# Patient Record
Sex: FEMALE | Race: AMERICAN INDIAN OR ALASKA NATIVE
[De-identification: names, ages, dates, MRNs, and addresses within clinical notes are randomized per-mention and may not be internally consistent; named-entity substitution may affect disease eponyms.]

---

## 2020-11-18 ENCOUNTER — HOSPITAL ENCOUNTER (OUTPATIENT)
Dept: HOSPITAL 5 - TRG | Age: 25
Discharge: HOME | End: 2020-11-18
Attending: OBSTETRICS & GYNECOLOGY
Payer: MEDICAID

## 2020-11-18 VITALS — SYSTOLIC BLOOD PRESSURE: 132 MMHG | DIASTOLIC BLOOD PRESSURE: 71 MMHG

## 2020-11-18 DIAGNOSIS — Z3A.36: ICD-10-CM

## 2020-11-18 DIAGNOSIS — O47.03: Primary | ICD-10-CM

## 2020-11-18 LAB
BASOPHILS # (AUTO): 0.1 K/MM3 (ref 0–0.1)
BASOPHILS NFR BLD AUTO: 0.6 % (ref 0–1.8)
EOSINOPHIL # BLD AUTO: 0.2 K/MM3 (ref 0–0.4)
EOSINOPHIL NFR BLD AUTO: 2.1 % (ref 0–4.3)
HCT VFR BLD CALC: 30.8 % (ref 30.3–42.9)
HGB BLD-MCNC: 10.1 GM/DL (ref 10.1–14.3)
LYMPHOCYTES # BLD AUTO: 1.4 K/MM3 (ref 1.2–5.4)
LYMPHOCYTES NFR BLD AUTO: 15.7 % (ref 13.4–35)
MCHC RBC AUTO-ENTMCNC: 33 % (ref 30–34)
MCV RBC AUTO: 92 FL (ref 79–97)
MONOCYTES # (AUTO): 0.9 K/MM3 (ref 0–0.8)
MONOCYTES % (AUTO): 10.4 % (ref 0–7.3)
PLATELET # BLD: 353 K/MM3 (ref 140–440)
RBC # BLD AUTO: 3.35 M/MM3 (ref 3.65–5.03)

## 2020-11-18 PROCEDURE — 36415 COLL VENOUS BLD VENIPUNCTURE: CPT

## 2020-11-18 PROCEDURE — 76805 OB US >/= 14 WKS SNGL FETUS: CPT

## 2020-11-18 PROCEDURE — 85025 COMPLETE CBC W/AUTO DIFF WBC: CPT

## 2020-11-18 PROCEDURE — 87806 HIV AG W/HIV1&2 ANTB W/OPTIC: CPT

## 2020-11-18 PROCEDURE — 86762 RUBELLA ANTIBODY: CPT

## 2020-11-18 PROCEDURE — 86706 HEP B SURFACE ANTIBODY: CPT

## 2020-11-18 PROCEDURE — 86803 HEPATITIS C AB TEST: CPT

## 2020-11-18 PROCEDURE — 86592 SYPHILIS TEST NON-TREP QUAL: CPT

## 2020-11-18 PROCEDURE — 76819 FETAL BIOPHYS PROFIL W/O NST: CPT

## 2020-11-18 NOTE — ULTRASOUND REPORT
ULTRASOUND OBSTETRIC LIMITED 

ULTRASOUND FETAL BIOPHYSICAL PROFILE



INDICATION / CLINICAL INFORMATION:

no prenatal.



COMPARISON:

None available.



FINDINGS:



FETAL BREATHING MOVEMENT = 2

GROSS BODY MOVEMENT = 2

FETAL TONE = 2

QUALITATIVE AMNIOTIC FLUID VOLUME = 2



TOTAL BIOPHYSICAL SCORE = 8/8





Biparietal Diameter = 9.8 cm = 40.1 weeks.days

Head Circumference = 34.8 cm = 40.2 weeks.days

Abdominal Circumference = 35.3 cm = 39.1 weeks.days

Femur Length = 7.7 cm = 39.4 weeks.days

Average Ultrasound Age (AUA) = 39.6 weeks.days



Fetal Heart Rate: 131  beats per minute. 

Estimated Fetal Birth Weight in grams (if calculated): 3810





Fetal Position: cephalic. 

Placenta: posterofundal and free of the os.

Amniotic Fluid Volume: normal 

Amniotic Fluid Index (NIKOLAI) in cm (if calculated): 15.4.

Maternal Adnexa: No significant abnormality.





FETAL ANATOMY: 

Fetal organs (including the bladder, stomach, kidneys, heart, umbilical cord, diaphragm, cord inserti
on, spine and intracranial structures) are visualized and show no significant abnormality with the fo
llowing exception(s): Head anatomy and cord insertion were not demonstrated secondary to fetal lie.



ADDITIONAL FINDINGS: None.



IMPRESSION:

1. Fetal Biophysical Score = 8/8

2. No significant sonographic abnormality.



Signer Name: Vin Felix MD 

Signed: 11/18/2020 6:19 PM

Workstation Name: [id number]

## 2020-11-18 NOTE — ULTRASOUND REPORT
ULTRASOUND OBSTETRIC LIMITED 

ULTRASOUND FETAL BIOPHYSICAL PROFILE



INDICATION / CLINICAL INFORMATION:

no prenatal.



COMPARISON:

None available.



FINDINGS:



FETAL BREATHING MOVEMENT = 2

GROSS BODY MOVEMENT = 2

FETAL TONE = 2

QUALITATIVE AMNIOTIC FLUID VOLUME = 2



TOTAL BIOPHYSICAL SCORE = 8/8





Biparietal Diameter = 9.8 cm = 40.1 weeks.days

Head Circumference = 34.8 cm = 40.2 weeks.days

Abdominal Circumference = 35.3 cm = 39.1 weeks.days

Femur Length = 7.7 cm = 39.4 weeks.days

Average Ultrasound Age (AUA) = 39.6 weeks.days



Fetal Heart Rate: 131  beats per minute. 

Estimated Fetal Birth Weight in grams (if calculated): 3810





Fetal Position: cephalic. 

Placenta: posterofundal and free of the os.

Amniotic Fluid Volume: normal 

Amniotic Fluid Index (NIKOLAI) in cm (if calculated): 15.4.

Maternal Adnexa: No significant abnormality.





FETAL ANATOMY: 

Fetal organs (including the bladder, stomach, kidneys, heart, umbilical cord, diaphragm, cord inserti
on, spine and intracranial structures) are visualized and show no significant abnormality with the fo
llowing exception(s): Head anatomy and cord insertion were not demonstrated secondary to fetal lie.



ADDITIONAL FINDINGS: None.



IMPRESSION:

1. Fetal Biophysical Score = 8/8

2. No significant sonographic abnormality.



Signer Name: Vin Felix MD 

Signed: 11/18/2020 6:19 PM

Workstation Name: [id number]